# Patient Record
Sex: FEMALE | Race: WHITE | NOT HISPANIC OR LATINO | URBAN - METROPOLITAN AREA
[De-identification: names, ages, dates, MRNs, and addresses within clinical notes are randomized per-mention and may not be internally consistent; named-entity substitution may affect disease eponyms.]

---

## 2021-04-26 ENCOUNTER — APPOINTMENT (RX ONLY)
Dept: URBAN - METROPOLITAN AREA CLINIC 153 | Facility: CLINIC | Age: 66
Setting detail: DERMATOLOGY
End: 2021-04-26

## 2021-04-26 DIAGNOSIS — L57.0 ACTINIC KERATOSIS: ICD-10-CM

## 2021-04-26 DIAGNOSIS — L82.1 OTHER SEBORRHEIC KERATOSIS: ICD-10-CM

## 2021-04-26 DIAGNOSIS — D22 MELANOCYTIC NEVI: ICD-10-CM

## 2021-04-26 DIAGNOSIS — D18.0 HEMANGIOMA: ICD-10-CM

## 2021-04-26 DIAGNOSIS — L82.0 INFLAMED SEBORRHEIC KERATOSIS: ICD-10-CM

## 2021-04-26 DIAGNOSIS — Z71.89 OTHER SPECIFIED COUNSELING: ICD-10-CM

## 2021-04-26 PROBLEM — D48.5 NEOPLASM OF UNCERTAIN BEHAVIOR OF SKIN: Status: ACTIVE | Noted: 2021-04-26

## 2021-04-26 PROBLEM — D22.5 MELANOCYTIC NEVI OF TRUNK: Status: ACTIVE | Noted: 2021-04-26

## 2021-04-26 PROBLEM — D18.01 HEMANGIOMA OF SKIN AND SUBCUTANEOUS TISSUE: Status: ACTIVE | Noted: 2021-04-26

## 2021-04-26 PROCEDURE — 99203 OFFICE O/P NEW LOW 30 MIN: CPT | Mod: 25

## 2021-04-26 PROCEDURE — ? LIQUID NITROGEN

## 2021-04-26 PROCEDURE — 11102 TANGNTL BX SKIN SINGLE LES: CPT | Mod: 59

## 2021-04-26 PROCEDURE — 17000 DESTRUCT PREMALG LESION: CPT | Mod: 59

## 2021-04-26 PROCEDURE — ? BIOPSY BY SHAVE METHOD

## 2021-04-26 PROCEDURE — 17110 DESTRUCTION B9 LES UP TO 14: CPT

## 2021-04-26 PROCEDURE — ? COUNSELING

## 2021-04-26 ASSESSMENT — LOCATION DETAILED DESCRIPTION DERM
LOCATION DETAILED: LEFT MEDIAL FOREHEAD
LOCATION DETAILED: RIGHT PROXIMAL PRETIBIAL REGION
LOCATION DETAILED: LEFT LATERAL SUPERIOR CHEST
LOCATION DETAILED: LEFT PROXIMAL DORSAL FOREARM
LOCATION DETAILED: RIGHT DISTAL DORSAL FOREARM

## 2021-04-26 ASSESSMENT — LOCATION SIMPLE DESCRIPTION DERM
LOCATION SIMPLE: LEFT FOREARM
LOCATION SIMPLE: RIGHT FOREARM
LOCATION SIMPLE: LEFT FOREHEAD
LOCATION SIMPLE: RIGHT PRETIBIAL REGION
LOCATION SIMPLE: CHEST

## 2021-04-26 ASSESSMENT — LOCATION ZONE DERM
LOCATION ZONE: TRUNK
LOCATION ZONE: FACE
LOCATION ZONE: ARM
LOCATION ZONE: LEG

## 2024-10-28 NOTE — PROCEDURE: BIOPSY BY SHAVE METHOD
All Conversations on this Encounter  Garcia Huang MD  You25 minutes ago (2:49 PM)       Rx for tramadol sent      Cryotherapy Text: The wound bed was treated with cryotherapy after the biopsy was performed.

## 2025-01-31 ENCOUNTER — EMERGENCY VISIT (OUTPATIENT)
Age: 70
End: 2025-01-31

## 2025-01-31 DIAGNOSIS — H00.031: ICD-10-CM

## 2025-01-31 DIAGNOSIS — H00.021: ICD-10-CM

## 2025-01-31 PROCEDURE — 99214 OFFICE O/P EST MOD 30 MIN: CPT

## 2025-01-31 RX ORDER — CEPHALEXIN 500 MG/1: 1 CAPSULE ORAL TWICE A DAY

## 2025-02-14 ENCOUNTER — FOLLOW UP (OUTPATIENT)
Age: 70
End: 2025-02-14

## 2025-02-14 DIAGNOSIS — H00.021: ICD-10-CM

## 2025-02-14 DIAGNOSIS — H00.024: ICD-10-CM

## 2025-02-14 PROCEDURE — 99213 OFFICE O/P EST LOW 20 MIN: CPT
